# Patient Record
Sex: MALE | Race: WHITE | NOT HISPANIC OR LATINO | Employment: OTHER | URBAN - METROPOLITAN AREA
[De-identification: names, ages, dates, MRNs, and addresses within clinical notes are randomized per-mention and may not be internally consistent; named-entity substitution may affect disease eponyms.]

---

## 2017-06-17 ENCOUNTER — HOSPITAL ENCOUNTER (EMERGENCY)
Facility: HOSPITAL | Age: 77
Discharge: HOME | End: 2017-06-17
Attending: INTERNAL MEDICINE
Payer: MEDICARE

## 2017-06-17 VITALS
OXYGEN SATURATION: 99 % | TEMPERATURE: 98 F | RESPIRATION RATE: 20 BRPM | SYSTOLIC BLOOD PRESSURE: 134 MMHG | HEART RATE: 61 BPM | DIASTOLIC BLOOD PRESSURE: 78 MMHG | HEIGHT: 70 IN | WEIGHT: 143.38 LBS | BODY MASS INDEX: 20.53 KG/M2

## 2017-06-17 DIAGNOSIS — B96.89 ACUTE BRONCHITIS, BACTERIAL: Primary | ICD-10-CM

## 2017-06-17 DIAGNOSIS — J20.8 ACUTE BRONCHITIS, BACTERIAL: Primary | ICD-10-CM

## 2017-06-17 DIAGNOSIS — J43.1 PANLOBULAR EMPHYSEMA: ICD-10-CM

## 2017-06-17 DIAGNOSIS — R04.2 HEMOPTYSIS: ICD-10-CM

## 2017-06-17 LAB
ALBUMIN SERPL BCP-MCNC: 3.8 G/DL
ALP SERPL-CCNC: 94 U/L
ALT SERPL W/O P-5'-P-CCNC: 9 U/L
ANION GAP SERPL CALC-SCNC: 8 MMOL/L
AST SERPL-CCNC: 11 U/L
BASOPHILS # BLD AUTO: 0.02 K/UL
BASOPHILS NFR BLD: 0.2 %
BILIRUB SERPL-MCNC: 0.6 MG/DL
BUN SERPL-MCNC: 16 MG/DL
CALCIUM SERPL-MCNC: 9.4 MG/DL
CHLORIDE SERPL-SCNC: 105 MMOL/L
CO2 SERPL-SCNC: 26 MMOL/L
CREAT SERPL-MCNC: 1 MG/DL
DIFFERENTIAL METHOD: NORMAL
EOSINOPHIL # BLD AUTO: 0.2 K/UL
EOSINOPHIL NFR BLD: 1.8 %
ERYTHROCYTE [DISTWIDTH] IN BLOOD BY AUTOMATED COUNT: 13.7 %
EST. GFR  (AFRICAN AMERICAN): >60 ML/MIN/1.73 M^2
EST. GFR  (NON AFRICAN AMERICAN): >60 ML/MIN/1.73 M^2
GLUCOSE SERPL-MCNC: 88 MG/DL
HCT VFR BLD AUTO: 45.2 %
HGB BLD-MCNC: 15.3 G/DL
LYMPHOCYTES # BLD AUTO: 2.1 K/UL
LYMPHOCYTES NFR BLD: 24.2 %
MCH RBC QN AUTO: 31 PG
MCHC RBC AUTO-ENTMCNC: 33.8 %
MCV RBC AUTO: 92 FL
MONOCYTES # BLD AUTO: 0.8 K/UL
MONOCYTES NFR BLD: 9 %
NEUTROPHILS # BLD AUTO: 5.5 K/UL
NEUTROPHILS NFR BLD: 64.6 %
PLATELET # BLD AUTO: 172 K/UL
PMV BLD AUTO: 9.9 FL
POTASSIUM SERPL-SCNC: 4 MMOL/L
PROT SERPL-MCNC: 6.7 G/DL
RBC # BLD AUTO: 4.94 M/UL
SODIUM SERPL-SCNC: 139 MMOL/L
WBC # BLD AUTO: 8.54 K/UL

## 2017-06-17 PROCEDURE — 80053 COMPREHEN METABOLIC PANEL: CPT

## 2017-06-17 PROCEDURE — 85025 COMPLETE CBC W/AUTO DIFF WBC: CPT

## 2017-06-17 PROCEDURE — 99284 EMERGENCY DEPT VISIT MOD MDM: CPT

## 2017-06-17 RX ORDER — DEXTROMETHORPHAN HBR AND GUAIFENESIN 5; 100 MG/5ML; MG/5ML
10 LIQUID ORAL EVERY 6 HOURS PRN
Qty: 237 ML | Refills: 0 | Status: SHIPPED | OUTPATIENT
Start: 2017-06-17 | End: 2017-06-27

## 2017-06-17 RX ORDER — AZITHROMYCIN 250 MG/1
250 TABLET, FILM COATED ORAL DAILY
Qty: 6 TABLET | Refills: 0 | Status: SHIPPED | OUTPATIENT
Start: 2017-06-17 | End: 2018-06-22

## 2017-06-17 NOTE — ED PROVIDER NOTES
"Encounter Date: 6/17/2017       History   Pt states noticed some blood when he cough for the last 3 days. Few episodes. Denies trauma, fever, chills, weight loss, night sweats. Pt admits smoking.  Chief Complaint   Patient presents with    Hemoptysis     x2-3 days. Intermittent. Pt reports "few" episodes where he coughed up dark red sputum. In between, coughed up clear sputum. Denies any other symptoms. Denies blood in stool or urine. Does not take blood thinners.      Review of patient's allergies indicates:  No Known Allergies  The history is provided by the patient.     Past Medical History:   Diagnosis Date    Hypertension      Past Surgical History:   Procedure Laterality Date    RECONSTRUCTION OF NOSE       History reviewed. No pertinent family history.  Social History   Substance Use Topics    Smoking status: Current Every Day Smoker     Packs/day: 1.00     Types: Cigarettes    Smokeless tobacco: Never Used    Alcohol use Yes      Comment: every now & then     Review of Systems   Constitutional: Negative for chills and fever.   HENT: Negative for dental problem and sore throat.    Eyes: Negative for visual disturbance.   Respiratory: Positive for cough. Negative for shortness of breath.    Cardiovascular: Negative for chest pain.   Gastrointestinal: Negative for abdominal pain, blood in stool, diarrhea and vomiting.   Genitourinary: Negative for difficulty urinating, discharge, dysuria and testicular pain.   Musculoskeletal: Negative for back pain and myalgias.   Skin: Negative for rash.   Neurological: Negative for weakness and headaches.   Psychiatric/Behavioral: Negative for sleep disturbance.   All other systems reviewed and are negative.      Physical Exam     Initial Vitals [06/17/17 1513]   BP Pulse Resp Temp SpO2   (!) 145/67 71 20 97.9 °F (36.6 °C) 95 %     Physical Exam    Nursing note and vitals reviewed.  Constitutional: He appears well-developed. No distress.   Pink puffer appearance "   HENT:   Head: Normocephalic and atraumatic.   Nose: Nose normal.   Mouth/Throat: Oropharynx is clear and moist. No oropharyngeal exudate.   Eyes: Conjunctivae are normal. Pupils are equal, round, and reactive to light.   Neck: Normal range of motion. Neck supple.   Cardiovascular: Regular rhythm, normal heart sounds and intact distal pulses.   Pulmonary/Chest: Breath sounds normal. No respiratory distress.   Abdominal: Soft. Bowel sounds are normal. He exhibits no distension. There is no tenderness. There is no rebound and no guarding.   Musculoskeletal: Normal range of motion. He exhibits no edema.   Neurological: He is alert and oriented to person, place, and time. He has normal strength.   Skin: Skin is warm and dry. Capillary refill takes less than 2 seconds. No rash noted.   Psychiatric: His behavior is normal.         ED Course   Procedures  Labs Reviewed   COMPREHENSIVE METABOLIC PANEL - Abnormal; Notable for the following:        Result Value    ALT 9 (*)     All other components within normal limits   CBC W/ AUTO DIFFERENTIAL         Results for orders placed or performed during the hospital encounter of 06/17/17   CBC auto differential   Result Value Ref Range    WBC 8.54 3.90 - 12.70 K/uL    RBC 4.94 4.60 - 6.20 M/uL    Hemoglobin 15.3 14.0 - 18.0 g/dL    Hematocrit 45.2 40.0 - 54.0 %    MCV 92 82 - 98 fL    MCH 31.0 27.0 - 31.0 pg    MCHC 33.8 32.0 - 36.0 %    RDW 13.7 11.5 - 14.5 %    Platelets 172 150 - 350 K/uL    MPV 9.9 9.2 - 12.9 fL    Gran # 5.5 1.8 - 7.7 K/uL    Lymph # 2.1 1.0 - 4.8 K/uL    Mono # 0.8 0.3 - 1.0 K/uL    Eos # 0.2 0.0 - 0.5 K/uL    Baso # 0.02 0.00 - 0.20 K/uL    Gran% 64.6 38.0 - 73.0 %    Lymph% 24.2 18.0 - 48.0 %    Mono% 9.0 4.0 - 15.0 %    Eosinophil% 1.8 0.0 - 8.0 %    Basophil% 0.2 0.0 - 1.9 %    Differential Method Automated    Comprehensive metabolic panel   Result Value Ref Range    Sodium 139 136 - 145 mmol/L    Potassium 4.0 3.5 - 5.1 mmol/L    Chloride 105 95 - 110  mmol/L    CO2 26 23 - 29 mmol/L    Glucose 88 70 - 110 mg/dL    BUN, Bld 16 8 - 23 mg/dL    Creatinine 1.0 0.5 - 1.4 mg/dL    Calcium 9.4 8.7 - 10.5 mg/dL    Total Protein 6.7 6.0 - 8.4 g/dL    Albumin 3.8 3.5 - 5.2 g/dL    Total Bilirubin 0.6 0.1 - 1.0 mg/dL    Alkaline Phosphatase 94 55 - 135 U/L    AST 11 10 - 40 U/L    ALT 9 (L) 10 - 44 U/L    Anion Gap 8 8 - 16 mmol/L    eGFR if African American >60.0 >60 mL/min/1.73 m^2    eGFR if non African American >60.0 >60 mL/min/1.73 m^2       Imaging Results          X-Ray Chest PA And Lateral (Final result)  Result time 06/17/17 15:47:25    Final result by Erica Ocampo MD (06/17/17 15:47:25)                 Impression:      No acute process.  Please see above.      Electronically signed by: ERICA OCAMPO MD  Date:     06/17/17  Time:    15:47              Narrative:    Exam: XR CHEST PA AND LATERAL,    Date:  06/17/17 15:45:45    History: Hemoptysis.    Comparison:  No prior relevant studies available    Findings: Heart size is normal.  There are symmetric densities over the lower lung zones with are probably nipple shadows.    Symmetric apical pleural thickening.  Moderate thoracic spondylosis.  Bilateral shoulder arthropathy.                                                   ED Course   Pre-hypertension/Hypertension: The pt has been informed that they may have pre-hypertension or hypertension based on a blood pressure reading in the ED. I recommend that the pt call the PCP listed on their discharge instructions or a physician of their choice this week to arrange f/u for further evaluation of possible pre-hypertension or hypertension.     By PQRS quality measures in adult patients diagnosed with acute bronchitis recommendations are the avoidance of antibiotics. Nevertheless this patient has history of COPD for which this acute exacerbation appears to be associated to a bacterial infection in lung structural disease for which antibiotic therapy is  recommended.   Clinical Impression:   The primary encounter diagnosis was Acute bronchitis, bacterial. Diagnoses of Hemoptysis and Panlobular emphysema were also pertinent to this visit.    Disposition:   Disposition: Discharged  Condition: Stable       Martínez Bourgeois MD  06/17/17 [id number]

## 2018-06-22 ENCOUNTER — INITIAL CONSULT (OUTPATIENT)
Dept: HEMATOLOGY/ONCOLOGY | Facility: CLINIC | Age: 78
End: 2018-06-22
Payer: MEDICARE

## 2018-06-22 VITALS
OXYGEN SATURATION: 98 % | BODY MASS INDEX: 21.05 KG/M2 | HEIGHT: 70 IN | SYSTOLIC BLOOD PRESSURE: 108 MMHG | DIASTOLIC BLOOD PRESSURE: 57 MMHG | WEIGHT: 147.06 LBS | HEART RATE: 86 BPM | TEMPERATURE: 98 F

## 2018-06-22 DIAGNOSIS — C67.1 MALIGNANT NEOPLASM OF DOME OF URINARY BLADDER: Primary | ICD-10-CM

## 2018-06-22 DIAGNOSIS — Z93.6 PRESENCE OF UROSTOMY: ICD-10-CM

## 2018-06-22 PROBLEM — C67.9 BLADDER CANCER: Status: ACTIVE | Noted: 2018-06-22

## 2018-06-22 PROCEDURE — 99999 PR PBB SHADOW E&M-EST. PATIENT-LVL III: CPT | Mod: PBBFAC,,, | Performed by: INTERNAL MEDICINE

## 2018-06-22 PROCEDURE — 99205 OFFICE O/P NEW HI 60 MIN: CPT | Mod: S$GLB,,, | Performed by: INTERNAL MEDICINE

## 2018-06-22 RX ORDER — APIXABAN 5 MG/1
5 TABLET, FILM COATED ORAL 2 TIMES DAILY
Refills: 3 | COMMUNITY
Start: 2018-06-11 | End: 2018-07-20 | Stop reason: SDUPTHER

## 2018-06-22 RX ORDER — AMLODIPINE BESYLATE 5 MG/1
5 TABLET ORAL DAILY
COMMUNITY
End: 2018-07-20 | Stop reason: SDUPTHER

## 2018-06-22 NOTE — PROGRESS NOTES
MAIN COMPLAINT:  This is a 77-year-old  gentleman who comes as a   self-referral for a second opinion regarding his history of operated bladder   cancer.    HISTORY OF PRESENT ILLNESS:  This 77-year-old gentleman apparently was having   hematuria earlier in 2018.  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  He underwent a radical bladder   dissection with prostatectomy and removal of lymph nodes.  He tells me that one   lymph node was positive.  He was told he needed adjuvant chemotherapy and   received 4 of 5 cycles of chemotherapy under Dr. Malagon outside the clinic.    He had many side   effects associated with the therapy and he elected not to have it  anymore.    ALLERGIES:  None.    MEDICATIONS:  See MedCard.    PREVIOUS SURGERIES:  Broken nose, MediPort.  Right cataract surgery.    Bladder/prostate removal.    SOCIAL HISTORY:  He is single with two children.  He lives in [place] with   relatives.  He used to smoke a pack a day since age 16.  Denies significant   drinking.  He worked in refrigeration.    FAMILY HISTORY:  Sister had breast cancer.  Father had stomach cancer.  No   diabetes or heart attack.    PAST  MEDICAL HISTORY:  1.  COPD.  2.  Shortness of breath.  3.  High blood pressure.  4.  History of surgery for bladder cancer with removal of the bladder and   urostomy.    REVIEW OF SYSTEMS:  GENERAL:  No weight loss or fatigue.  EYE:  No vision problems or excessive lacrimations.  OROPHARYNX:  No difficulty or pain on swallowing.  ENDOCRINE:  No heat or cold intolerance.  LUNGS:  Moderate shortness of breath on exertion.  CARDIOVASCULAR:  No chest pains or palpitation.  GASTROINTESTINAL:  No nausea, vomiting or diarrhea.  GENITOURINARY:  Status post bladder cancer removal with urostomy in the right   lower quadrant.  MUSCULOSKELETAL:  No neck, hip or back pain.  PSYCHIATRIC:  No mood swings or depression.  SKIN:  No blisters or  chemosis.    PHYSICAL EXAMINATION:  GENERAL:  He was well groomed.  He interacted normally during the interview.  VITAL SIGNS:  Weight 147 pounds.  Oxygen saturation 98%, height 5 feet 10   inches, blood pressure 108/57, pulse 86, respirations 14.  EYES:  No jaundice or paleness.  OROPHARYNX:  No ulcers.  No exudates.  ENDOCRINE:  No palpable thyroid.  LYMPHATICS:  No cervical or supraclavicular adenopathy.  NECK:  No jugular venous distention or masses.  LUNGS:  Clear and well ventilated without rales, wheezes, or rhonchi.  CARDIOVASCULAR:  The heart was rhythmic I-II/VI systolic ejection murmur at the   base.  No gallops.  ABDOMEN:  There is a urinary ileostomy in the right lower quadrant.  No   hepatosplenomegaly, guarding or rebound.  EXTREMITIES:  No edema.  SKIN:  No blisters or ecchymosis.  NEUROLOGIC:  Coordination, strength and gait were normal.  PSYCHIATRIC:  Mood was appropriate.  Lucid and oriented x3.    DISCUSSION:  This patient seemed to have had adjuvant chemotherapy for node   positive/muscle invasive bladder cancer.  We will try to obtain the records from   his previous treatments.  He will have a CBC, CMP and CT scans of the chest,   abdomen and pelvis, and see me in 7-10 days.      RDF/HN  dd: 06/22/2018 15:10:07 (CDT)  td: 06/22/2018 18:18:54 (CDT)  Doc ID   #[id number]  Job ID #[id number]    CC:

## 2018-06-27 ENCOUNTER — TELEPHONE (OUTPATIENT)
Dept: HEMATOLOGY/ONCOLOGY | Facility: CLINIC | Age: 78
End: 2018-06-27

## 2018-06-27 NOTE — TELEPHONE ENCOUNTER
----- Message from Anisha Roy sent at 6/27/2018 12:53 PM CDT -----  Contact: pt  Calling in regards to a missed call and please advise. [phone] (home)

## 2018-07-17 ENCOUNTER — TELEPHONE (OUTPATIENT)
Dept: RADIOLOGY | Facility: HOSPITAL | Age: 78
End: 2018-07-17

## 2018-07-18 ENCOUNTER — HOSPITAL ENCOUNTER (OUTPATIENT)
Dept: RADIOLOGY | Facility: HOSPITAL | Age: 78
Discharge: HOME | End: 2018-07-18
Attending: INTERNAL MEDICINE
Payer: MEDICARE

## 2018-07-18 DIAGNOSIS — C67.1 MALIGNANT NEOPLASM OF DOME OF URINARY BLADDER: ICD-10-CM

## 2018-07-18 PROCEDURE — 74178 CT ABD&PLV WO CNTR FLWD CNTR: CPT | Mod: TC,PO

## 2018-07-18 PROCEDURE — 71260 CT THORAX DX C+: CPT | Mod: TC,PO

## 2018-07-18 PROCEDURE — 25500020 PHARM REV CODE 255: Mod: PO | Performed by: INTERNAL MEDICINE

## 2018-07-18 PROCEDURE — 74178 CT ABD&PLV WO CNTR FLWD CNTR: CPT | Mod: 26,,, | Performed by: RADIOLOGY

## 2018-07-18 PROCEDURE — 71260 CT THORAX DX C+: CPT | Mod: 26,,, | Performed by: RADIOLOGY

## 2018-07-18 RX ADMIN — IOHEXOL 100 ML: 350 INJECTION, SOLUTION INTRAVENOUS at 01:07

## 2018-07-18 RX ADMIN — IOHEXOL 30 ML: 350 INJECTION, SOLUTION INTRAVENOUS at 11:07

## 2018-07-19 ENCOUNTER — TELEPHONE (OUTPATIENT)
Dept: HEMATOLOGY/ONCOLOGY | Facility: CLINIC | Age: 78
End: 2018-07-19

## 2018-07-19 ENCOUNTER — DOCUMENTATION ONLY (OUTPATIENT)
Dept: HEMATOLOGY/ONCOLOGY | Facility: CLINIC | Age: 78
End: 2018-07-19

## 2018-07-19 NOTE — TELEPHONE ENCOUNTER
----- Message from Lizandro Suresh MD sent at 7/18/2018  5:38 PM CDT -----  Try to get him to come in.  Do we have the records from Dr Malagon.?

## 2018-07-19 NOTE — TELEPHONE ENCOUNTER
Returning call,please call back at [phone]. Md Matias     Spoke with.  Appt scheduled 7-20-18. ap

## 2018-07-19 NOTE — TELEPHONE ENCOUNTER
----- Message from Niko Sheehan sent at 7/19/2018  3:29 PM CDT -----  Contact: Pt  Please give pt a call at ..[phone] (home) he was returning the nurse call.

## 2018-07-20 ENCOUNTER — OFFICE VISIT (OUTPATIENT)
Dept: HEMATOLOGY/ONCOLOGY | Facility: CLINIC | Age: 78
End: 2018-07-20
Payer: MEDICARE

## 2018-07-20 VITALS
HEART RATE: 86 BPM | WEIGHT: 147.06 LBS | OXYGEN SATURATION: 98 % | DIASTOLIC BLOOD PRESSURE: 57 MMHG | SYSTOLIC BLOOD PRESSURE: 100 MMHG | TEMPERATURE: 98 F | HEIGHT: 70 IN | RESPIRATION RATE: 20 BRPM | BODY MASS INDEX: 21.05 KG/M2

## 2018-07-20 DIAGNOSIS — I10 ESSENTIAL HYPERTENSION: Primary | ICD-10-CM

## 2018-07-20 DIAGNOSIS — I82.412 DEEP VEIN THROMBOSIS (DVT) OF FEMORAL VEIN OF LEFT LOWER EXTREMITY, UNSPECIFIED CHRONICITY: ICD-10-CM

## 2018-07-20 PROBLEM — I82.402 LEFT LEG DVT: Status: ACTIVE | Noted: 2018-07-20

## 2018-07-20 PROCEDURE — 99999 PR PBB SHADOW E&M-EST. PATIENT-LVL III: CPT | Mod: PBBFAC,,, | Performed by: INTERNAL MEDICINE

## 2018-07-20 PROCEDURE — 99215 OFFICE O/P EST HI 40 MIN: CPT | Mod: S$GLB,,, | Performed by: INTERNAL MEDICINE

## 2018-07-20 RX ORDER — APIXABAN 5 MG/1
5 TABLET, FILM COATED ORAL 2 TIMES DAILY
Qty: 60 TABLET | Refills: 3 | Status: SHIPPED | OUTPATIENT
Start: 2018-07-20

## 2018-07-20 RX ORDER — AMLODIPINE BESYLATE 5 MG/1
5 TABLET ORAL DAILY
Qty: 30 TABLET | Refills: 3 | Status: SHIPPED | OUTPATIENT
Start: 2018-07-20

## 2018-07-20 NOTE — PROGRESS NOTES
Subjective:       Patient ID: Antione Unger is a 78 y.o. male.    Chief Complaint: No chief complaint on file.    HPI This 77-year-old gentleman apparently was having   hematuria earlier in 2018.  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  He underwent a radical bladder   dissection with prostatectomy and removal of lymph nodes  Since the last visit we have receiveed copies of his treatment from dr Malagon's   Office. He had a radica cystetcomy and prostatectomy for what turned out to be a nope positive bladder cancer. It invaded the prostate which also had a primary breast cancer.  He received adjuvant chemotherapy with Cisplatin/gemzar. He had to stop after 4 cycles due to perceived toxicity. He was placed on Eliquis due to a DVT of the left leg.  He had CT scans oc c/a/p within pur system that shows metastatic disease to the lungs  and a mass in left pelvis  He comes to discus the possibility of getting further treatment    ALLERGIES:  None.     MEDICATIONS:  See MedCard.     PREVIOUS SURGERIES:  Broken nose, MediPort.  Right cataract surgery.    Bladder/prostate removal.     SOCIAL HISTORY:  He is single with two children.  He lives in [place] with   relatives.  He used to smoke a pack a day since age 16.  Denies significant   drinking.  He worked in refrigeration.     FAMILY HISTORY:  Sister had breast cancer.  Father had stomach cancer.  No   diabetes or heart attack.     PAST  MEDICAL HISTORY:  1.  COPD.  2.  Shortness of breath.  3.  High blood pressure.  4.  History of surgery for bladder cancer with removal of the bladder and   urostomy.     Review of Systems   Constitutional: Negative.  Negative for fatigue.   Eyes: Negative.    Cardiovascular: Negative.  Negative for chest pain.   Gastrointestinal: Negative for abdominal pain and nausea.   Genitourinary: Negative.  Negative for hematuria.   Musculoskeletal: Negative.    Skin: Negative.     Neurological: Negative.    Psychiatric/Behavioral: Negative.        Objective:      Physical Exam   Constitutional: He is oriented to person, place, and time. He appears well-developed and well-nourished.   HENT:   Head: Normocephalic.   Mouth/Throat: No oropharyngeal exudate.   Eyes: Pupils are equal, round, and reactive to light.   Neck: No thyromegaly present.   Cardiovascular: Normal rate, regular rhythm and normal heart sounds.  Exam reveals no gallop.    No murmur heard.  Pulmonary/Chest: No respiratory distress. He has no wheezes. He has no rales.   Abdominal: Soft. Bowel sounds are normal. He exhibits no distension and no mass. There is no rebound and no guarding.   Musculoskeletal: Normal range of motion. He exhibits no edema.   Edema of left leg   Lymphadenopathy:     He has no cervical adenopathy.   Neurological: He is alert and oriented to person, place, and time.   Skin: Skin is warm and dry.   Psychiatric: He has a normal mood and affect. His behavior is normal.       Wt Readings from Last 3 Encounters:   07/20/18 66.7 kg (147 lb 0.8 oz)   06/22/18 66.7 kg (147 lb 0.8 oz)   06/17/17 65 kg (143 lb 6.4 oz)     Temp Readings from Last 3 Encounters:   07/20/18 97.8 °F (36.6 °C) (Oral)   06/22/18 98 °F (36.7 °C) (Oral)   06/17/17 97.9 °F (36.6 °C) (Oral)     BP Readings from Last 3 Encounters:   07/20/18 (!) 100/57   06/22/18 (!) 108/57   06/17/17 134/78     Pulse Readings from Last 3 Encounters:   07/20/18 86   06/22/18 86   06/17/17 61       Assessment:       1. Essential hypertension    2. Deep vein thrombosis (DVT) of femoral vein of left lower extremity, unspecified chronicity        Plan:       Lab Results   Component Value Date    WBC 6.46 07/18/2018    HGB 12.3 (L) 07/18/2018    HCT 38.0 (L) 07/18/2018    MCV 92 07/18/2018     07/18/2018     Lab Results   Component Value Date    CREATININE 1.1 07/18/2018       .  Lab Results   Component Value Date    ALT 7 (L) 07/18/2018    AST 11  07/18/2018    ALKPHOS 102 07/18/2018    BILITOT 0.7 07/18/2018       He was given copies of the Ct scans report. He was amde aware this is a potentially treatable but incurable condition.  we offered him second l;ine treatment with TEQCENTRIC which is approved for 2 nd line bladder cancer after Cisplatin failure, regardless of PD-L1 status  He was not ready to make a decision. We agreed he will see one of my partners in 2 weeks to discuss treatment  vs Hospice.  Refill of Eliquis and amlodipine given to patient  Complex visit requiring discussing end of life issues, treatment options and prognosis

## 2018-08-03 ENCOUNTER — OFFICE VISIT (OUTPATIENT)
Dept: HEMATOLOGY/ONCOLOGY | Facility: CLINIC | Age: 78
End: 2018-08-03
Payer: MEDICARE

## 2018-08-03 VITALS
OXYGEN SATURATION: 100 % | RESPIRATION RATE: 18 BRPM | BODY MASS INDEX: 21.11 KG/M2 | DIASTOLIC BLOOD PRESSURE: 80 MMHG | WEIGHT: 147.5 LBS | TEMPERATURE: 98 F | HEART RATE: 79 BPM | HEIGHT: 70 IN | SYSTOLIC BLOOD PRESSURE: 116 MMHG

## 2018-08-03 DIAGNOSIS — C77.2 BLADDER CANCER METASTASIZED TO INTRA-ABDOMINAL LYMPH NODES: Primary | ICD-10-CM

## 2018-08-03 DIAGNOSIS — C67.9 BLADDER CANCER METASTASIZED TO INTRA-ABDOMINAL LYMPH NODES: Primary | ICD-10-CM

## 2018-08-03 PROCEDURE — 99999 PR PBB SHADOW E&M-EST. PATIENT-LVL III: CPT | Mod: PBBFAC,,, | Performed by: INTERNAL MEDICINE

## 2018-08-03 PROCEDURE — 99215 OFFICE O/P EST HI 40 MIN: CPT | Mod: S$GLB,,, | Performed by: INTERNAL MEDICINE

## 2018-08-03 RX ORDER — HYDROCODONE BITARTRATE AND ACETAMINOPHEN 5; 325 MG/1; MG/1
1 TABLET ORAL EVERY 6 HOURS PRN
Qty: 60 TABLET | Refills: 0 | Status: SHIPPED | OUTPATIENT
Start: 2018-08-03 | End: 2018-08-20 | Stop reason: SDUPTHER

## 2018-08-03 NOTE — PROGRESS NOTES
Hematology/Oncology Office Note    Reason for referral:  Metastatic urothelial carcinoma      CC:  Bladder cancer    Referred by:  No ref. provider found    Diagnosis:  Metastatic urothelial carcinoma    Treatment:  Cisplatin/Gemzar unable tolerate due to toxicity      History of present illness:  77-year-old gentleman apparently was having   hematuria earlier in 2018.  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  He underwent a radical bladder   dissection with prostatectomy and removal of lymph nodes  Since the last visit we have receiveed copies of his treatment from dr Malagon's   Office. He had a radica cystetcomy and prostatectomy for what turned out to be a nope positive bladder cancer. It invaded the prostate which also had a primary breast cancer.  He received adjuvant chemotherapy with Cisplatin/gemzar. He had to stop after 4 cycles due to perceived toxicity. He was placed on Eliquis due to a DVT of the left leg.  He had CT scans oc c/a/p within pur system that shows metastatic disease to the lungs  and a mass in left pelvis  He comes to discus the possibility of getting further treatment        Past Medical History:   Diagnosis Date    Bladder cancer     Cataract     COPD (chronic obstructive pulmonary disease)     Emphysema of lung     Hypertension     Prostate cancer          Social History:      Family History: family history is not on file.      HPI  Review of Systems   Constitutional: Positive for activity change and fatigue. Negative for appetite change, chills, diaphoresis, fever and unexpected weight change.   HENT: Negative for congestion, dental problem, drooling, ear pain, facial swelling, nosebleeds, rhinorrhea and sinus pressure.    Eyes: Negative.    Respiratory: Negative for apnea, cough, choking, chest tightness and shortness of breath.    Cardiovascular: Negative for chest pain, palpitations and leg swelling.    Gastrointestinal: Positive for abdominal pain. Negative for abdominal distention, anal bleeding, blood in stool, constipation, diarrhea and nausea.   Endocrine: Negative.    Genitourinary: Negative for difficulty urinating, dysuria, enuresis, flank pain, frequency, genital sores, hematuria and testicular pain.   Musculoskeletal: Negative for arthralgias, back pain, gait problem, joint swelling, myalgias, neck pain and neck stiffness.   Skin: Negative for color change, pallor, rash and wound.   Allergic/Immunologic: Negative.    Neurological: Negative for dizziness, tremors, facial asymmetry, speech difficulty, light-headedness, numbness and headaches.   Hematological: Negative for adenopathy. Does not bruise/bleed easily.   Psychiatric/Behavioral: Negative for agitation, confusion, decreased concentration and hallucinations. The patient is not nervous/anxious and is not hyperactive.        Objective:      Physical Exam   Constitutional: He is oriented to person, place, and time. He appears well-developed and well-nourished. No distress.   HENT:   Head: Normocephalic and atraumatic.   Nose: Nose normal.   Mouth/Throat: Oropharynx is clear and moist. No oropharyngeal exudate.   Eyes: Conjunctivae and EOM are normal. Pupils are equal, round, and reactive to light. Right eye exhibits no discharge. Left eye exhibits no discharge. No scleral icterus.   Neck: Normal range of motion. Neck supple. No JVD present. No tracheal deviation present. No thyromegaly present.   Cardiovascular: Normal rate and regular rhythm.  Exam reveals no gallop and no friction rub.    No murmur heard.  Pulmonary/Chest: Effort normal and breath sounds normal. No stridor. No respiratory distress. He has no wheezes. He has no rales. He exhibits no tenderness.   Abdominal: Soft. Bowel sounds are normal. He exhibits no distension and no mass. There is no tenderness. There is no rebound.   Ileostomy conduit    Musculoskeletal: Normal range of motion.  He exhibits no edema or tenderness.   Lymphadenopathy:     He has no cervical adenopathy.   Neurological: He is alert and oriented to person, place, and time. No cranial nerve deficit. Coordination normal.   Skin: Skin is warm and dry. No rash noted. He is not diaphoretic.   Psychiatric: He has a normal mood and affect. Thought content normal.   Vitals reviewed.      Lab Results   Component Value Date    WBC 6.46 07/18/2018    HGB 12.3 (L) 07/18/2018    HCT 38.0 (L) 07/18/2018    MCV 92 07/18/2018     07/18/2018     Lab Results   Component Value Date    CREATININE 1.1 07/18/2018    BUN 21 07/18/2018     07/18/2018    K 5.1 07/18/2018     07/18/2018    CO2 25 07/18/2018     Lab Results   Component Value Date    ALT 7 (L) 07/18/2018    AST 11 07/18/2018    ALKPHOS 102 07/18/2018    BILITOT 0.7 07/18/2018         Assessment:       78-year-old gentleman who is status post radical cystectomy/prostatectomy with positive pelvic lymph nodes 02/2018.  The patient was undergoing adjuvant treatment with cisplatin/Gemzar the however had significant toxicity the head to discontinue treatment.  Imaging shortly after discontinuing treatment demonstrated disease recurrence with large pelvic sidewall mass and bilateral lung nodules consistent with metastasis.  The patient was offered second-line treatment with Tecentriq by Dr. Braswell.    The patient is still unsure whether he wants to proceed with immunotherapy or proceed with hospice care.  I had a lengthy discussion concerning the possible side effects associated with immunotherapy and he would like to research treatment and make a decision within the next week.  We will have the patient follow up in 1 week.    Metastatic urothelial carcinoma:  --hospice versus Tecentriq  --patient will consider options over the next week and decide at followup   --follow-up in 1 week      Distress Screening Results: Psychosocial Distress screening score of Distress Score: 0  noted and reviewed. No intervention indicated.

## 2018-08-07 ENCOUNTER — TELEPHONE (OUTPATIENT)
Dept: HEMATOLOGY/ONCOLOGY | Facility: CLINIC | Age: 78
End: 2018-08-07

## 2018-08-07 NOTE — TELEPHONE ENCOUNTER
Informed patient records will be faxed over to Dr. Florian Osman office at [facility].

## 2018-08-07 NOTE — TELEPHONE ENCOUNTER
----- Message from Lawrence Forrest sent at 8/7/2018  9:34 AM CDT -----  Contact: pt   Pt is requesting a call back from the nurse in regards to  Getting his Urology Dr Baird  His test results.  [phone] (home)

## 2018-08-18 DIAGNOSIS — C67.9 BLADDER CANCER METASTASIZED TO INTRA-ABDOMINAL LYMPH NODES: ICD-10-CM

## 2018-08-18 DIAGNOSIS — C77.2 BLADDER CANCER METASTASIZED TO INTRA-ABDOMINAL LYMPH NODES: ICD-10-CM

## 2018-08-18 RX ORDER — HYDROCODONE BITARTRATE AND ACETAMINOPHEN 5; 325 MG/1; MG/1
TABLET ORAL
Qty: 60 TABLET | Refills: 0 | Status: CANCELLED | OUTPATIENT
Start: 2018-08-18

## 2018-08-20 DIAGNOSIS — C67.9 BLADDER CANCER METASTASIZED TO INTRA-ABDOMINAL LYMPH NODES: ICD-10-CM

## 2018-08-20 DIAGNOSIS — C77.2 BLADDER CANCER METASTASIZED TO INTRA-ABDOMINAL LYMPH NODES: ICD-10-CM

## 2018-08-20 RX ORDER — HYDROCODONE BITARTRATE AND ACETAMINOPHEN 5; 325 MG/1; MG/1
1 TABLET ORAL EVERY 6 HOURS PRN
Qty: 60 TABLET | Refills: 0 | Status: SHIPPED | OUTPATIENT
Start: 2018-08-20 | End: 2018-08-24

## 2018-08-24 ENCOUNTER — OFFICE VISIT (OUTPATIENT)
Dept: HEMATOLOGY/ONCOLOGY | Facility: CLINIC | Age: 78
End: 2018-08-24
Payer: MEDICARE

## 2018-08-24 VITALS
HEIGHT: 70 IN | OXYGEN SATURATION: 96 % | SYSTOLIC BLOOD PRESSURE: 102 MMHG | WEIGHT: 143.06 LBS | DIASTOLIC BLOOD PRESSURE: 58 MMHG | BODY MASS INDEX: 20.48 KG/M2 | TEMPERATURE: 98 F | HEART RATE: 82 BPM | RESPIRATION RATE: 18 BRPM

## 2018-08-24 DIAGNOSIS — C77.2 BLADDER CANCER METASTASIZED TO INTRA-ABDOMINAL LYMPH NODES: Primary | ICD-10-CM

## 2018-08-24 DIAGNOSIS — C67.9 BLADDER CANCER METASTASIZED TO INTRA-ABDOMINAL LYMPH NODES: Primary | ICD-10-CM

## 2018-08-24 PROCEDURE — 99214 OFFICE O/P EST MOD 30 MIN: CPT | Mod: S$GLB,,, | Performed by: INTERNAL MEDICINE

## 2018-08-24 PROCEDURE — 99999 PR PBB SHADOW E&M-EST. PATIENT-LVL III: CPT | Mod: PBBFAC,,, | Performed by: INTERNAL MEDICINE

## 2018-08-24 RX ORDER — PANTOPRAZOLE SODIUM 40 MG/1
40 TABLET, DELAYED RELEASE ORAL DAILY
Qty: 30 TABLET | Refills: 1 | Status: SHIPPED | OUTPATIENT
Start: 2018-08-24 | End: 2019-08-24

## 2018-08-24 RX ORDER — HYDROCODONE BITARTRATE AND ACETAMINOPHEN 10; 325 MG/1; MG/1
1 TABLET ORAL EVERY 4 HOURS PRN
Qty: 90 TABLET | Refills: 0 | Status: SHIPPED | OUTPATIENT
Start: 2018-08-24

## 2018-08-27 NOTE — PROGRESS NOTES
Hematology/Oncology Office Note    Reason for referral:  Metastatic urothelial carcinoma      CC:  Bladder cancer    Referred by:  No ref. provider found    Diagnosis:  Metastatic urothelial carcinoma    Treatment:  Cisplatin/Gemzar unable tolerate due to toxicity      History of present illness:  77-year-old gentleman apparently was having   hematuria earlier in 2018.  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  He underwent a radical bladder   dissection with prostatectomy and removal of lymph nodes  Since the last visit we have receiveed copies of his treatment from dr Malagon's   Office. He had a radica cystetcomy and prostatectomy for what turned out to be a nope positive bladder cancer. It invaded the prostate which also had a primary breast cancer.  He received adjuvant chemotherapy with Cisplatin/gemzar. He had to stop after 4 cycles due to perceived toxicity. He was placed on Eliquis due to a DVT of the left leg.  He had CT scans oc c/a/p within pur system that shows metastatic disease to the lungs  and a mass in left pelvis  He comes to discus the possibility of getting further treatment    I have reviewed and updated the HPI, ROS, PMHx, Social Hx, Family Hx and treatment history.    Today's visit:  The patient presents today to discuss treatment versus hospice.  Patient is carefully considered his options and has decided to explore home hospice as he does not want additional systemic therapy at this point.        Past Medical History:   Diagnosis Date    Bladder cancer     Cataract     COPD (chronic obstructive pulmonary disease)     Emphysema of lung     Hypertension     Prostate cancer          Social History:  No tobacco, alcohol, or illicit drugs      Family History: family history is not on file.      HPI    Review of Systems   Constitutional: Positive for activity change and fatigue. Negative for appetite change, chills, diaphoresis,  "fever and unexpected weight change.   HENT: Negative for congestion, dental problem, drooling, ear discharge, ear pain, facial swelling, hearing loss, nosebleeds, rhinorrhea and sinus pressure.    Eyes: Negative.    Respiratory: Negative for apnea, cough, choking, chest tightness and shortness of breath.    Cardiovascular: Negative for chest pain, palpitations and leg swelling.   Gastrointestinal: Positive for abdominal pain. Negative for abdominal distention, anal bleeding, blood in stool, constipation, diarrhea and nausea.        Abdominal/pelvic pain   Endocrine: Negative.    Genitourinary: Negative for difficulty urinating, dysuria, enuresis, flank pain, frequency, genital sores, hematuria and testicular pain.   Musculoskeletal: Negative for arthralgias, joint swelling, myalgias, neck pain and neck stiffness.   Skin: Negative for color change, pallor and rash.   Allergic/Immunologic: Negative.    Neurological: Negative for dizziness, tremors, seizures, syncope, facial asymmetry, speech difficulty, weakness, light-headedness, numbness and headaches.   Hematological: Negative for adenopathy. Does not bruise/bleed easily.   Psychiatric/Behavioral: Negative for agitation, behavioral problems, confusion, decreased concentration, dysphoric mood and hallucinations. The patient is not hyperactive.        Objective:       Vitals:    08/24/18 1504   BP: (!) 102/58   Pulse: 82   Resp: 18   Temp: 97.8 °F (36.6 °C)   TempSrc: Oral   SpO2: 96%   Weight: 64.9 kg (143 lb 1.3 oz)   Height: 5' 10" (1.778 m)       Physical Exam   Constitutional: He is oriented to person, place, and time. He appears well-developed and well-nourished. No distress.   HENT:   Head: Normocephalic and atraumatic.   Nose: Nose normal.   Mouth/Throat: Oropharynx is clear and moist. No oropharyngeal exudate.   Eyes: Conjunctivae and EOM are normal. Pupils are equal, round, and reactive to light. Right eye exhibits no discharge. Left eye exhibits no " discharge. No scleral icterus.   Neck: Normal range of motion. Neck supple. No JVD present. No tracheal deviation present. No thyromegaly present.   Cardiovascular: Normal rate and regular rhythm. Exam reveals no gallop and no friction rub.   No murmur heard.  Pulmonary/Chest: Effort normal and breath sounds normal. No stridor. No respiratory distress. He has no wheezes. He has no rales. He exhibits no tenderness.   Abdominal: Soft. Bowel sounds are normal. He exhibits no distension and no mass. There is no tenderness. There is no rebound.   Ileostomy conduit    Musculoskeletal: Normal range of motion. He exhibits no edema or tenderness.   Lymphadenopathy:     He has no cervical adenopathy.   Neurological: He is alert and oriented to person, place, and time. No cranial nerve deficit. Coordination normal.   Skin: Skin is warm, dry and intact. No abrasion, no bruising, no ecchymosis and no rash noted. He is not diaphoretic. No cyanosis. No pallor. Nails show no clubbing.   Psychiatric: He has a normal mood and affect. Thought content normal.   Vitals reviewed.      Lab Results   Component Value Date    WBC 6.46 07/18/2018    HGB 12.3 (L) 07/18/2018    HCT 38.0 (L) 07/18/2018    MCV 92 07/18/2018     07/18/2018     Lab Results   Component Value Date    CREATININE 1.1 07/18/2018    BUN 21 07/18/2018     07/18/2018    K 5.1 07/18/2018     07/18/2018    CO2 25 07/18/2018     Lab Results   Component Value Date    ALT 7 (L) 07/18/2018    AST 11 07/18/2018    ALKPHOS 102 07/18/2018    BILITOT 0.7 07/18/2018         Assessment:       78-year-old gentleman who is status post radical cystectomy/prostatectomy with positive pelvic lymph nodes 02/2018.  The patient was undergoing adjuvant treatment with cisplatin/Gemzar the however had significant toxicity the head to discontinue treatment.  Imaging shortly after discontinuing treatment demonstrated disease recurrence with large pelvic sidewall mass and  bilateral lung nodules consistent with metastasis.  The patient was offered second-line treatment with Tecentriq by Dr. Braswell.    The patient has declined systemic/palliative therapy and would like to proceed with hospice placement.  We will consult home hospice for informational visit.  The patient will follow up in our clinic p.r.n.      Metastatic urothelial carcinoma:  --patient has elected to proceed with home hospice placement